# Patient Record
Sex: MALE | Race: WHITE | Employment: OTHER | URBAN - METROPOLITAN AREA
[De-identification: names, ages, dates, MRNs, and addresses within clinical notes are randomized per-mention and may not be internally consistent; named-entity substitution may affect disease eponyms.]

---

## 2018-10-30 ENCOUNTER — OFFICE VISIT (OUTPATIENT)
Dept: URGENT CARE | Facility: URGENT CARE | Age: 43
End: 2018-10-30
Payer: COMMERCIAL

## 2018-10-30 VITALS
TEMPERATURE: 97.2 F | OXYGEN SATURATION: 96 % | DIASTOLIC BLOOD PRESSURE: 74 MMHG | HEART RATE: 78 BPM | RESPIRATION RATE: 21 BRPM | SYSTOLIC BLOOD PRESSURE: 126 MMHG | WEIGHT: 190.06 LBS

## 2018-10-30 DIAGNOSIS — R06.2 WHEEZING: Primary | ICD-10-CM

## 2018-10-30 DIAGNOSIS — R05.3 PERSISTENT COUGH: ICD-10-CM

## 2018-10-30 PROCEDURE — 99204 OFFICE O/P NEW MOD 45 MIN: CPT | Mod: 25 | Performed by: PHYSICIAN ASSISTANT

## 2018-10-30 PROCEDURE — 94640 AIRWAY INHALATION TREATMENT: CPT | Performed by: PHYSICIAN ASSISTANT

## 2018-10-30 RX ORDER — LEVALBUTEROL INHALATION SOLUTION 1.25 MG/3ML
SOLUTION RESPIRATORY (INHALATION)
Qty: 1 ML | Refills: 0
Start: 2018-10-30 | End: 2019-10-18

## 2018-10-30 RX ORDER — DOXYCYCLINE 100 MG/1
100 CAPSULE ORAL 2 TIMES DAILY
Qty: 20 CAPSULE | Refills: 0 | Status: SHIPPED | OUTPATIENT
Start: 2018-10-30 | End: 2019-10-18

## 2018-10-30 RX ORDER — PREDNISONE 20 MG/1
20 TABLET ORAL DAILY
Qty: 5 TABLET | Refills: 0 | Status: SHIPPED | OUTPATIENT
Start: 2018-10-30 | End: 2019-10-18

## 2018-10-30 NOTE — PATIENT INSTRUCTIONS
(R06.2) Wheezing  (primary encounter diagnosis)  Comment:   Plan: INHALATION/NEBULIZER TREATMENT, INITIAL,         levalbuterol (XOPENEX) 1.25 MG/3ML neb         solution, predniSONE (DELTASONE) 20 MG tablet            (R05) Persistent cough  Comment:   Plan: doxycycline (VIBRAMYCIN) 100 MG capsule            Follow up with primary clinic for re-check within 3-5 days, sooner should symptoms persist or worsen.      Continue albuterol inhaler every 4 hours as needed.

## 2018-10-30 NOTE — MR AVS SNAPSHOT
"              After Visit Summary   10/30/2018    Arden Dee    MRN: [id number]           Patient Information     Date Of Birth          1975        Visit Information        Provider Department      10/30/2018 12:45 PM Mya Holcomb PA-C [facility]        Today's Diagnoses     Wheezing    -  1    Persistent cough          Care Instructions    (R06.2) Wheezing  (primary encounter diagnosis)  Comment:   Plan: INHALATION/NEBULIZER TREATMENT, INITIAL,         levalbuterol (XOPENEX) 1.25 MG/3ML neb         solution, predniSONE (DELTASONE) 20 MG tablet            (R05) Persistent cough  Comment:   Plan: doxycycline (VIBRAMYCIN) 100 MG capsule            Follow up with primary clinic for re-check within 3-5 days, sooner should symptoms persist or worsen.      Continue albuterol inhaler every 4 hours as needed.                Follow-ups after your visit        Who to contact     If you have questions or need follow up information about today's clinic visit or your schedule please contact [facility] directly at [phone].  Normal or non-critical lab and imaging results will be communicated to you by Spirehart, letter or phone within 4 business days after the clinic has received the results. If you do not hear from us within 7 days, please contact the clinic through 10sect or phone. If you have a critical or abnormal lab result, we will notify you by phone as soon as possible.  Submit refill requests through Marvel or call your pharmacy and they will forward the refill request to us. Please allow 3 business days for your refill to be completed.          Additional Information About Your Visit        MyChart Information     Marvel lets you send messages to your doctor, view your test results, renew your prescriptions, schedule appointments and more. To sign up, go to [email or web address] . Click on \"Log in\" on the left side of the screen, " "which will take you to the Welcome page. Then click on \"Sign up Now\" on the right side of the page.     You will be asked to enter the access code listed below, as well as some personal information. Please follow the directions to create your username and password.     Your access code is: [email or web address]  Expires: 2019  2:01 PM     Your access code will  in 90 days. If you need help or a new code, please call your [facility] clinic or [phone].        Care EveryWhere ID     This is your Care EveryWhere ID. This could be used by other organizations to access your [facility] medical records  [id number]        Your Vitals Were     Pulse Temperature Respirations Pulse Oximetry          78 97.2  F (36.2  C) (Oral) 21 96%         Blood Pressure from Last 3 Encounters:   10/30/18 126/74    Weight from Last 3 Encounters:   10/30/18 190 lb 1 oz (86.2 kg)              We Performed the Following     INHALATION/NEBULIZER TREATMENT, INITIAL          Today's Medication Changes          These changes are accurate as of 10/30/18  2:01 PM.  If you have any questions, ask your nurse or doctor.               Start taking these medicines.        Dose/Directions    doxycycline 100 MG capsule   Commonly known as:  VIBRAMYCIN   Used for:  Persistent cough   Started by:  Mya Holcomb PA-C        Dose:  100 mg   Take 1 capsule (100 mg) by mouth 2 times daily   Quantity:  20 capsule   Refills:  0       levalbuterol 1.25 MG/3ML neb solution   Commonly known as:  XOPENEX   Used for:  Wheezing   Started by:  Mya Holcomb PA-C        One nebulizer treatment in clinic   Quantity:  1 mL   Refills:  0       predniSONE 20 MG tablet   Commonly known as:  DELTASONE   Used for:  Wheezing   Started by:  Mya Holcomb PA-C        Dose:  20 mg   Take 1 tablet (20 mg) by mouth daily   Quantity:  5 tablet   Refills:  0            Where to get your medicines      These medications were sent to [facility] [address] [address] & [address], [place] [address]     Phone:  [phone]     doxycycline 100 MG capsule    predniSONE 20 MG tablet         Some of these will need a paper prescription and others can be bought over the counter.  Ask your nurse if you have questions.     You don't need a prescription for these medications     levalbuterol 1.25 MG/3ML neb solution                Primary Care Provider Fax #    Physician No Ref-Primary [phone]       No address on file        Equal Access to Services     [facility]: Hadii harmony sprague Sonadeem, waaxda luqadaha, qaybta kaalmada maria a, mat sanchez . So [facility] [phone].    ATENCIÓN: Si habla español, tiene a zuniga disposición servicios gratuitos de asistencia lingüística. Ll[facility] [phone].    We comply with applicable federal civil rights laws and [place] laws. We do not discriminate on the basis of race, color, national origin, age, disability, sex, sexual orientation, or gender identity.            Thank you!     Thank you for choosing [facility]  for your care. Our goal is always to provide you with excellent care. Hearing back from our patients is one way we can continue to improve our services. Please take a few minutes to complete the written survey that you may receive in the mail after your visit with us. Thank you!             Your Updated Medication List - Protect others around you: Learn how to safely use, store and throw away your medicines at www.disposemymeds.org.          This list is accurate as of 10/30/18  2:01 PM.  Always use your most recent med list.                   Brand Name Dispense Instructions for use Diagnosis    ALBUTEROL IN      Inhale into the lungs as needed        doxycycline 100 MG capsule    VIBRAMYCIN    20 capsule    Take 1 capsule (100 mg) by mouth 2 times daily    Persistent cough        levalbuterol 1.25 MG/3ML neb solution    XOPENEX    1 mL    One nebulizer treatment in clinic    Wheezing       predniSONE 20 MG tablet    DELTASONE    5 tablet    Take 1 tablet (20 mg) by mouth daily    Wheezing

## 2018-10-30 NOTE — PROGRESS NOTES
SUBJECTIVE:   Arden Dee is a 43 year old male presenting with a chief complaint of:  1) cough for the past few days, mostly dry  2) intermittent wheezing.  3) some runny nose/congestion  4) fatigue intermittently over the past month.  Some lightheadedness at times.  NO syncope.      Onset of symptoms was as above.  Course of illness is worsening.    Severity moderate  Current and Associated symptoms: as above  Treatment measures tried include albuterol inhaler with some relief.  Predisposing factors include h/o wheezing.  History of tobacco use, quit 4 years ago.    SH: has been seen at [facility] in the past    FH:  Father: reactive airway ?      No past medical history on file.     Wheezing 2 years ago, has used inhalers in the past    There is no problem list on file for this patient.    Social History   Substance Use Topics     Smoking status: Former Smoker     Smokeless tobacco: Never Used     Alcohol use Not on file       ROS:  CONSTITUTIONAL:NEGATIVE for fever, chills, change in weight  INTEGUMENTARY/SKIN: NEGATIVE for worrisome rashes, moles or lesions  EYES: NEGATIVE for vision changes or irritation  ENT/MOUTH: as per HPI  RESP:as per HPI  CV: NEGATIVE for chest pain, palpitations or peripheral edema  GI: NEGATIVE for nausea, abdominal pain, heartburn, or change in bowel habits  : negative for dysuria, hematuria, decreased urinary stream, erectile dysfunction  MUSCULOSKELETAL: NEGATIVE for significant arthralgias or myalgia  NEURO: NEGATIVE for weakness, dizziness or paresthesias  Review of systems negative except as stated above.    OBJECTIVE  :/74  Pulse 78  Temp 97.2  F (36.2  C) (Oral)  Resp 21  Wt 190 lb 1 oz (86.2 kg)  SpO2 96%  GENERAL APPEARANCE: healthy, alert and no distress  EYES: EOMI,  PERRL, conjunctiva clear  HENT: ear canals and TM's normal.  Nose and mouth without ulcers, erythema or lesions  NECK: supple, nontender, no lymphadenopathy  RESP: wheezing throughout  which improves but does not clear completely with neb in clinic  CV: regular rates and rhythm, normal S1 S2, no murmur noted  ABDOMEN:  soft, nontender, no HSM or masses and bowel sounds normal  NEURO: Normal strength and tone, sensory exam grossly normal,  normal speech and mentation  SKIN: no suspicious lesions or rashes    (R06.2) Wheezing  (primary encounter diagnosis)  Comment:   Plan: INHALATION/NEBULIZER TREATMENT, INITIAL,         levalbuterol (XOPENEX) 1.25 MG/3ML neb         solution, predniSONE (DELTASONE) 20 MG tablet            (R05) Persistent cough  Comment:   Plan: doxycycline (VIBRAMYCIN) 100 MG capsule            Follow up with primary clinic for re-check within 3-5 days, sooner should symptoms persist or worsen.      Continue albuterol inhaler every 4 hours as needed.

## 2019-10-18 ENCOUNTER — HOSPITAL ENCOUNTER (OUTPATIENT)
Facility: CLINIC | Age: 44
Setting detail: OBSERVATION
Discharge: HOME | End: 2019-10-19
Attending: EMERGENCY MEDICINE | Admitting: HOSPITALIST

## 2019-10-18 ENCOUNTER — APPOINTMENT (OUTPATIENT)
Dept: CT IMAGING | Facility: CLINIC | Age: 44
End: 2019-10-18
Attending: EMERGENCY MEDICINE

## 2019-10-18 DIAGNOSIS — R19.7 DIARRHEA OF PRESUMED INFECTIOUS ORIGIN: ICD-10-CM

## 2019-10-18 DIAGNOSIS — A49.8 CLOSTRIDIUM DIFFICILE INFECTION: Primary | ICD-10-CM

## 2019-10-18 DIAGNOSIS — E87.1 HYPONATREMIA: ICD-10-CM

## 2019-10-18 DIAGNOSIS — E86.9 VOLUME DEPLETION: ICD-10-CM

## 2019-10-18 LAB
ALBUMIN SERPL-MCNC: 1.6 G/DL (ref 3.4–5)
ALP SERPL-CCNC: 86 U/L (ref 40–150)
ALT SERPL W P-5'-P-CCNC: 87 U/L (ref 0–70)
ANION GAP SERPL CALCULATED.3IONS-SCNC: 6 MMOL/L (ref 3–14)
AST SERPL W P-5'-P-CCNC: 40 U/L (ref 0–45)
BASOPHILS # BLD AUTO: 0 10E9/L (ref 0–0.2)
BASOPHILS NFR BLD AUTO: 0.2 %
BILIRUB SERPL-MCNC: 0.4 MG/DL (ref 0.2–1.3)
BUN SERPL-MCNC: 9 MG/DL (ref 7–30)
C COLI+JEJUNI+LARI FUSA STL QL NAA+PROBE: NOT DETECTED
C DIFF TOX B STL QL: POSITIVE
CALCIUM SERPL-MCNC: 7.9 MG/DL (ref 8.5–10.1)
CHLORIDE SERPL-SCNC: 95 MMOL/L (ref 94–109)
CO2 SERPL-SCNC: 29 MMOL/L (ref 20–32)
CREAT SERPL-MCNC: 0.52 MG/DL (ref 0.66–1.25)
DIFFERENTIAL METHOD BLD: ABNORMAL
EC STX1 GENE STL QL NAA+PROBE: NOT DETECTED
EC STX2 GENE STL QL NAA+PROBE: NOT DETECTED
ENTERIC PATHOGEN COMMENT: NORMAL
EOSINOPHIL # BLD AUTO: 0 10E9/L (ref 0–0.7)
EOSINOPHIL NFR BLD AUTO: 0.4 %
ERYTHROCYTE [DISTWIDTH] IN BLOOD BY AUTOMATED COUNT: 13.4 % (ref 10–15)
GFR SERPL CREATININE-BSD FRML MDRD: >90 ML/MIN/{1.73_M2}
GLUCOSE SERPL-MCNC: 108 MG/DL (ref 70–99)
HCT VFR BLD AUTO: 35 % (ref 40–53)
HGB BLD-MCNC: 11.7 G/DL (ref 13.3–17.7)
IMM GRANULOCYTES # BLD: 0.1 10E9/L (ref 0–0.4)
IMM GRANULOCYTES NFR BLD: 1 %
LYMPHOCYTES # BLD AUTO: 1.9 10E9/L (ref 0.8–5.3)
LYMPHOCYTES NFR BLD AUTO: 22.6 %
MCH RBC QN AUTO: 28.3 PG (ref 26.5–33)
MCHC RBC AUTO-ENTMCNC: 33.4 G/DL (ref 31.5–36.5)
MCV RBC AUTO: 85 FL (ref 78–100)
MONOCYTES # BLD AUTO: 1 10E9/L (ref 0–1.3)
MONOCYTES NFR BLD AUTO: 12.1 %
NEUTROPHILS # BLD AUTO: 5.2 10E9/L (ref 1.6–8.3)
NEUTROPHILS NFR BLD AUTO: 63.7 %
NOROV GI+II ORF1-ORF2 JNC STL QL NAA+PR: NOT DETECTED
NRBC # BLD AUTO: 0 10*3/UL
NRBC BLD AUTO-RTO: 0 /100
PLATELET # BLD AUTO: 556 10E9/L (ref 150–450)
POTASSIUM SERPL-SCNC: 3.5 MMOL/L (ref 3.4–5.3)
PROT SERPL-MCNC: 6.4 G/DL (ref 6.8–8.8)
RBC # BLD AUTO: 4.13 10E12/L (ref 4.4–5.9)
RVA NSP5 STL QL NAA+PROBE: NOT DETECTED
SALMONELLA SP RPOD STL QL NAA+PROBE: NOT DETECTED
SHIGELLA SP+EIEC IPAH STL QL NAA+PROBE: NOT DETECTED
SODIUM SERPL-SCNC: 130 MMOL/L (ref 133–144)
SPECIMEN SOURCE: ABNORMAL
V CHOL+PARA RFBL+TRKH+TNAA STL QL NAA+PR: NOT DETECTED
WBC # BLD AUTO: 8.2 10E9/L (ref 4–11)
Y ENTERO RECN STL QL NAA+PROBE: NOT DETECTED

## 2019-10-18 PROCEDURE — 74177 CT ABD & PELVIS W/CONTRAST: CPT

## 2019-10-18 PROCEDURE — 87493 C DIFF AMPLIFIED PROBE: CPT | Performed by: EMERGENCY MEDICINE

## 2019-10-18 PROCEDURE — 80053 COMPREHEN METABOLIC PANEL: CPT | Performed by: EMERGENCY MEDICINE

## 2019-10-18 PROCEDURE — 99219 ZZC INITIAL OBSERVATION CARE,LEVL II: CPT | Performed by: HOSPITALIST

## 2019-10-18 PROCEDURE — 96375 TX/PRO/DX INJ NEW DRUG ADDON: CPT

## 2019-10-18 PROCEDURE — 25000125 ZZHC RX 250: Performed by: EMERGENCY MEDICINE

## 2019-10-18 PROCEDURE — 87506 IADNA-DNA/RNA PROBE TQ 6-11: CPT | Performed by: EMERGENCY MEDICINE

## 2019-10-18 PROCEDURE — 25800030 ZZH RX IP 258 OP 636: Performed by: HOSPITALIST

## 2019-10-18 PROCEDURE — 99285 EMERGENCY DEPT VISIT HI MDM: CPT | Mod: 25

## 2019-10-18 PROCEDURE — 96365 THER/PROPH/DIAG IV INF INIT: CPT | Mod: 59

## 2019-10-18 PROCEDURE — 85025 COMPLETE CBC W/AUTO DIFF WBC: CPT | Performed by: EMERGENCY MEDICINE

## 2019-10-18 PROCEDURE — 25000128 H RX IP 250 OP 636: Performed by: EMERGENCY MEDICINE

## 2019-10-18 PROCEDURE — 25000128 H RX IP 250 OP 636: Performed by: HOSPITALIST

## 2019-10-18 PROCEDURE — 25000132 ZZH RX MED GY IP 250 OP 250 PS 637: Performed by: HOSPITALIST

## 2019-10-18 PROCEDURE — 99207 ZZC CDG-MDM COMPONENT: MEETS LOW - DOWN CODED: CPT | Performed by: HOSPITALIST

## 2019-10-18 PROCEDURE — 96361 HYDRATE IV INFUSION ADD-ON: CPT

## 2019-10-18 PROCEDURE — 96376 TX/PRO/DX INJ SAME DRUG ADON: CPT

## 2019-10-18 PROCEDURE — G0378 HOSPITAL OBSERVATION PER HR: HCPCS

## 2019-10-18 RX ORDER — PAROXETINE 20 MG/1
20 TABLET, FILM COATED ORAL EVERY MORNING
Status: DISCONTINUED | OUTPATIENT
Start: 2019-10-19 | End: 2019-10-19 | Stop reason: HOSPADM

## 2019-10-18 RX ORDER — VANCOMYCIN HYDROCHLORIDE 50 MG/ML
125 KIT ORAL 4 TIMES DAILY
Status: DISCONTINUED | OUTPATIENT
Start: 2019-10-19 | End: 2019-10-19 | Stop reason: HOSPADM

## 2019-10-18 RX ORDER — MORPHINE SULFATE 4 MG/ML
4 INJECTION, SOLUTION INTRAMUSCULAR; INTRAVENOUS ONCE
Status: COMPLETED | OUTPATIENT
Start: 2019-10-18 | End: 2019-10-18

## 2019-10-18 RX ORDER — VANCOMYCIN HYDROCHLORIDE 50 MG/ML
125 KIT ORAL ONCE
Status: COMPLETED | OUTPATIENT
Start: 2019-10-18 | End: 2019-10-18

## 2019-10-18 RX ORDER — NALOXONE HYDROCHLORIDE 0.4 MG/ML
.1-.4 INJECTION, SOLUTION INTRAMUSCULAR; INTRAVENOUS; SUBCUTANEOUS
Status: DISCONTINUED | OUTPATIENT
Start: 2019-10-18 | End: 2019-10-19 | Stop reason: HOSPADM

## 2019-10-18 RX ORDER — LACTOBACILLUS RHAMNOSUS GG 10B CELL
1 CAPSULE ORAL DAILY
COMMUNITY

## 2019-10-18 RX ORDER — HYDROXYZINE HYDROCHLORIDE 10 MG/5ML
4 SYRUP ORAL EVERY 6 HOURS PRN
COMMUNITY

## 2019-10-18 RX ORDER — ONDANSETRON 2 MG/ML
4 INJECTION INTRAMUSCULAR; INTRAVENOUS EVERY 6 HOURS PRN
Status: DISCONTINUED | OUTPATIENT
Start: 2019-10-18 | End: 2019-10-19 | Stop reason: HOSPADM

## 2019-10-18 RX ORDER — CLONAZEPAM 0.5 MG/1
0.5 TABLET ORAL 2 TIMES DAILY PRN
Status: DISCONTINUED | OUTPATIENT
Start: 2019-10-18 | End: 2019-10-19 | Stop reason: HOSPADM

## 2019-10-18 RX ORDER — PAROXETINE 10 MG/1
20 TABLET, FILM COATED ORAL EVERY MORNING
COMMUNITY

## 2019-10-18 RX ORDER — SODIUM CHLORIDE 9 MG/ML
INJECTION, SOLUTION INTRAVENOUS CONTINUOUS
Status: DISCONTINUED | OUTPATIENT
Start: 2019-10-18 | End: 2019-10-19 | Stop reason: HOSPADM

## 2019-10-18 RX ORDER — ACETAMINOPHEN 650 MG/1
650 SUPPOSITORY RECTAL EVERY 4 HOURS PRN
Status: DISCONTINUED | OUTPATIENT
Start: 2019-10-18 | End: 2019-10-19 | Stop reason: HOSPADM

## 2019-10-18 RX ORDER — ONDANSETRON 4 MG/1
4 TABLET, ORALLY DISINTEGRATING ORAL EVERY 6 HOURS PRN
Status: DISCONTINUED | OUTPATIENT
Start: 2019-10-18 | End: 2019-10-19 | Stop reason: HOSPADM

## 2019-10-18 RX ORDER — IOPAMIDOL 755 MG/ML
83 INJECTION, SOLUTION INTRAVASCULAR ONCE
Status: COMPLETED | OUTPATIENT
Start: 2019-10-18 | End: 2019-10-18

## 2019-10-18 RX ORDER — CLONAZEPAM 0.5 MG/1
0.5 TABLET ORAL 2 TIMES DAILY PRN
COMMUNITY

## 2019-10-18 RX ORDER — SODIUM CHLORIDE 9 MG/ML
INJECTION, SOLUTION INTRAVENOUS ONCE
Status: DISCONTINUED | OUTPATIENT
Start: 2019-10-18 | End: 2019-10-18

## 2019-10-18 RX ORDER — MORPHINE SULFATE 2 MG/ML
2-4 INJECTION, SOLUTION INTRAMUSCULAR; INTRAVENOUS
Status: DISCONTINUED | OUTPATIENT
Start: 2019-10-18 | End: 2019-10-19 | Stop reason: HOSPADM

## 2019-10-18 RX ORDER — CEFTRIAXONE 1 G/1
1 INJECTION, POWDER, FOR SOLUTION INTRAMUSCULAR; INTRAVENOUS ONCE
Status: COMPLETED | OUTPATIENT
Start: 2019-10-18 | End: 2019-10-18

## 2019-10-18 RX ORDER — LIDOCAINE 40 MG/G
CREAM TOPICAL
Status: DISCONTINUED | OUTPATIENT
Start: 2019-10-18 | End: 2019-10-19 | Stop reason: HOSPADM

## 2019-10-18 RX ORDER — ACETAMINOPHEN 325 MG/1
650 TABLET ORAL EVERY 4 HOURS PRN
Status: DISCONTINUED | OUTPATIENT
Start: 2019-10-18 | End: 2019-10-19 | Stop reason: HOSPADM

## 2019-10-18 RX ORDER — LACTOBACILLUS RHAMNOSUS GG 10B CELL
1 CAPSULE ORAL DAILY
Status: DISCONTINUED | OUTPATIENT
Start: 2019-10-19 | End: 2019-10-19 | Stop reason: HOSPADM

## 2019-10-18 RX ADMIN — IOPAMIDOL 83 ML: 755 INJECTION, SOLUTION INTRAVENOUS at 12:59

## 2019-10-18 RX ADMIN — VANCOMYCIN HYDROCHLORIDE 125 MG: KIT at 21:54

## 2019-10-18 RX ADMIN — SODIUM CHLORIDE 1000 ML: 9 INJECTION, SOLUTION INTRAVENOUS at 11:53

## 2019-10-18 RX ADMIN — MORPHINE SULFATE 4 MG: 4 INJECTION INTRAVENOUS at 14:28

## 2019-10-18 RX ADMIN — CEFTRIAXONE SODIUM 1 G: 1 INJECTION, POWDER, FOR SOLUTION INTRAMUSCULAR; INTRAVENOUS at 14:31

## 2019-10-18 RX ADMIN — MORPHINE SULFATE 2 MG: 2 INJECTION, SOLUTION INTRAMUSCULAR; INTRAVENOUS at 16:27

## 2019-10-18 RX ADMIN — MORPHINE SULFATE 2 MG: 2 INJECTION, SOLUTION INTRAMUSCULAR; INTRAVENOUS at 20:39

## 2019-10-18 RX ADMIN — SODIUM CHLORIDE 65 ML: 9 INJECTION, SOLUTION INTRAVENOUS at 13:00

## 2019-10-18 RX ADMIN — SODIUM CHLORIDE, PRESERVATIVE FREE: 5 INJECTION INTRAVENOUS at 16:11

## 2019-10-18 ASSESSMENT — ENCOUNTER SYMPTOMS
BACK PAIN: 1
BLOOD IN STOOL: 0
ABDOMINAL PAIN: 1
DIARRHEA: 1
DIAPHORESIS: 1
FEVER: 0

## 2019-10-18 ASSESSMENT — MIFFLIN-ST. JEOR: SCORE: 1633.82

## 2019-10-18 NOTE — ED NOTES
"[facility]  ED Nurse Handoff Report    ED Chief complaint: Diarrhea      ED Diagnosis:   Final diagnoses:   Hyponatremia   Volume depletion   Diarrhea of presumed infectious origin       Code Status: Full Code    Allergies:   Allergies   Allergen Reactions     Penicillins Shortness Of Breath and Hives     All cillin's medication.        Activity level - Baseline/Home:  Independent  Activity Level - Current:   Independent    Patient's Preferred language: English   Needed?: No    Isolation: Yes  Infection: Not Applicable  Bariatric?: No    Vital Signs:   Vitals:    10/18/19 1108   BP: 101/66   Pulse: 98   Resp: 16   Temp: 98.1  F (36.7  C)   TempSrc: Oral   SpO2: 97%   Weight: 74.8 kg (165 lb)   Height: 1.753 m (5' 9\")       Cardiac Rhythm: ,        Pain level: 0-10 Pain Scale: 9    Is this patient confused?: No   Does this patient have a guardian?  No         If yes, is there guardianship documents in the Epic \"Code/ACP\" activity?  N/A         Guardian Notified?  N/A  [facility] - Suicide Severity Rating Scale Completed?  Yes  If yes, what color did the patient score?  White    Patient Report: Initial Complaint: Diarrhea  Focused Assessment: Pt with shigella diarrhea a few weeks ago. Pt was compliant with abx expect for the last 2 days. Now diarrhea returned with burning in the abd. Pt was given Morphine for pain and Rocephin. CT scan inflammation throughout the bowel.  Tests Performed: CT scan and blood work.  Abnormal Results:   Results for orders placed or performed during the hospital encounter of 10/18/19   CT Abdomen Pelvis w Contrast    Narrative    CT ABDOMEN AND PELVIS WITH CONTRAST 10/18/2019 1:06 PM    HISTORY:  44-year-old patient with acute generalized abdominal pain  with fever.    COMPARISON: None.    TECHNIQUE: Axial and coronal CT images obtained from the lung bases  through the abdomen and pelvis after the uneventful administration of  Isovue 370 intravenous contrast given " for a total of 83 mL. Radiation  dose for this scan was reduced using automated exposure control,  adjustment of the mA and/or kV according to patient size, or iterative  reconstruction technique.    FINDINGS: The visible lung bases are clear. Heart size is normal. The  liver, gallbladder, spleen, adrenal glands, and pancreas are  unremarkable. Both kidneys are normally perfused. No hydronephrosis or  nephrolithiasis. Trace free fluid in the left paracolic gutter and  pelvis. The bladder is decompressed. No acute osseous abnormality.  There is wall thickening and mild distention in the colon at the  splenic flexure as well as possible involvement of both ascending and  descending portions of the colon. Some inflammatory changes do  surround majority of the colon, though seemingly with rectal sparing.  The appendix is normal in size and appearance. No abnormally dilated  loops of small bowel.      Impression    IMPRESSION:  Wall thickening with variable degree of surrounding  inflammatory change throughout majority of the colon involving  ascending, transverse, descending portions of the colon, though  without obvious sigmoid or rectal involvement. Differential diagnosis  includes inflammatory or infectious etiology as most likely. Ischemic  or neoplastic considered less likely. Appendix is normal in size and  appearance.    DENISE SHAW MD   CBC with platelets differential   Result Value Ref Range    WBC 8.2 4.0 - 11.0 10e9/L    RBC Count 4.13 (L) 4.4 - 5.9 10e12/L    Hemoglobin 11.7 (L) 13.3 - 17.7 g/dL    Hematocrit 35.0 (L) 40.0 - 53.0 %    MCV 85 78 - 100 fl    MCH 28.3 26.5 - 33.0 pg    MCHC 33.4 31.5 - 36.5 g/dL    RDW 13.4 10.0 - 15.0 %    Platelet Count 556 (H) 150 - 450 10e9/L    Diff Method Automated Method     % Neutrophils 63.7 %    % Lymphocytes 22.6 %    % Monocytes 12.1 %    % Eosinophils 0.4 %    % Basophils 0.2 %    % Immature Granulocytes 1.0 %    Nucleated RBCs 0 0 /100    Absolute Neutrophil  5.2 1.6 - 8.3 10e9/L    Absolute Lymphocytes 1.9 0.8 - 5.3 10e9/L    Absolute Monocytes 1.0 0.0 - 1.3 10e9/L    Absolute Eosinophils 0.0 0.0 - 0.7 10e9/L    Absolute Basophils 0.0 0.0 - 0.2 10e9/L    Abs Immature Granulocytes 0.1 0 - 0.4 10e9/L    Absolute Nucleated RBC 0.0    Comprehensive metabolic panel   Result Value Ref Range    Sodium 130 (L) 133 - 144 mmol/L    Potassium 3.5 3.4 - 5.3 mmol/L    Chloride 95 94 - 109 mmol/L    Carbon Dioxide 29 20 - 32 mmol/L    Anion Gap 6 3 - 14 mmol/L    Glucose 108 (H) 70 - 99 mg/dL    Urea Nitrogen 9 7 - 30 mg/dL    Creatinine 0.52 (L) 0.66 - 1.25 mg/dL    GFR Estimate >90 >60 mL/min/[1.73_m2]    GFR Estimate If Black >90 >60 mL/min/[1.73_m2]    Calcium 7.9 (L) 8.5 - 10.1 mg/dL    Bilirubin Total 0.4 0.2 - 1.3 mg/dL    Albumin 1.6 (L) 3.4 - 5.0 g/dL    Protein Total 6.4 (L) 6.8 - 8.8 g/dL    Alkaline Phosphatase 86 40 - 150 U/L    ALT 87 (H) 0 - 70 U/L    AST 40 0 - 45 U/L       Treatments provided: Rocephin  Family Comments: Kristyn at the bedside.     OBS brochure/video discussed/provided to patient/family: Yes              Name of person given brochure if not patient: Patient            Relationship to patient: NA    ED Medications:   Medications   cefTRIAXone (ROCEPHIN) 1 g vial to attach to  mL bag for ADULTS or NS 50 mL bag for PEDS (1 g Intravenous New Bag 10/18/19 9801)   sodium chloride 0.9% infusion (has no administration in time range)   0.9% sodium chloride BOLUS (1,000 mLs Intravenous New Bag 10/18/19 1153)   iohexol (OMNIPAQUE) solution 25 mL (25 mLs Oral Given 10/18/19 1232)   iopamidol (ISOVUE-370) solution 83 mL (83 mLs Intravenous Given 10/18/19 1259)   Saline flush (65 mLs Intravenous Given 10/18/19 1300)   morphine (PF) injection 4 mg (4 mg Intravenous Given 10/18/19 1428)       Drips infusing?:  No    For the majority of the shift this patient was Green.   Interventions performed were NA.    Severe Sepsis OR Septic Shock Diagnosis Present:  No    To be done/followed up on inpatient unit:  Unknown    ED NURSE PHONE NUMBER: [phone]

## 2019-10-18 NOTE — PLAN OF CARE
Pt admitted to the floor at 1544. A&Ox4. VSS on RA. L/s clear. Denies SOB or chest pain. Abdomen soft, tender to palpation. Intermittent abdominal cramping managed w/ prn iv Morphine w/ good relief. Up independently in room. No loose stool since admission. Stool test for C diff pending. Tolerating clears. Denies nausea. PIV infusing. Enteric isolation maintained. GI consult pending. Will continue to monitor.

## 2019-10-18 NOTE — PHARMACY-ADMISSION MEDICATION HISTORY
Admission medication history interview status for the 10/18/2019  admission is complete. See EPIC admission navigator for prior to admission medications     Medication history source reliability:Good    Actions taken by pharmacist (provider contacted, etc): spoke with patient     Additional medication history information not noted on PTA med list :None    Medication reconciliation/reorder completed by provider prior to medication history? No    Time spent in this activity: 10 min    Prior to Admission medications    Medication Sig Last Dose Taking? Auth Provider   chlorpheniramine (CHLOR-TRIMETON) 4 MG tablet Take 4 mg by mouth every 6 hours as needed for allergies or rhinitis prn Yes Unknown, Entered By History   clonazePAM (KLONOPIN) 0.5 MG tablet Take 0.5 mg by mouth 2 times daily as needed for anxiety 10/18/2019 at Unknown time Yes Unknown, Entered By History   hypromellose (ARTIFICIAL TEARS) 0.5 % SOLN ophthalmic solution Place 1 drop into both eyes every hour as needed for dry eyes prn Yes Unknown, Entered By History   lactobacillus rhamnosus, GG, (CULTURELL) capsule Take 1 capsule by mouth daily 10/18/2019 at Unknown time Yes Unknown, Entered By History   PARoxetine (PAXIL) 10 MG tablet Take 20 mg by mouth every morning 10/18/2019 at Unknown time Yes Unknown, Entered By History   ALBUTEROL IN Inhale 1-2 puffs into the lungs every 6 hours as needed  prn  Reported, Patient       Luci Singh, PharmD

## 2019-10-18 NOTE — ED TRIAGE NOTES
Chronic diarrhea for several months. Urgent care dx with shigellae, started on abx. Abx changed type of diarrhea. Lost 40 lbs since July.

## 2019-10-18 NOTE — PROGRESS NOTES
RECEIVING UNIT ED HANDOFF REVIEW    ED Nurse Handoff Report was reviewed by: Jennifer Triplett RN on October 18, 2019 at 3:07 PM

## 2019-10-18 NOTE — PROGRESS NOTES
Observation goals PRIOR TO DISCHARGE     Comments: -diagnostic tests and consults completed and resulted: Not MET-C diff results pending. GI consult pending    -vital signs normal or at patient baseline: MET     -tolerating oral intake to maintain hydration: Not MET    Nurse to notify provider when observation goals have been met and patient is ready for discharge.

## 2019-10-18 NOTE — ED PROVIDER NOTES
"  History     Chief Complaint:  Diarrhea    The history is provided by the patient.      Arden Dee is a 44 year old male who presents with diarrhea. The patient reports having diarrhea since September 28th-29th. He was seen at urgent care and diagnosed with Shigella and started on bactrim on 10/10/19. The patient stopped taking his bactrim after 5.5 days because he developed \"a different diarrhea\" and had bad abdominal cramps with his last day taking it 3 days ago. The patient thought he was getting better but 2 days ago he developed the increased diarrhea again and abdominal cramping. He states that last night it felt like his stomach was on \"fire.\" He denies any blood in his stool. He states that prior to the diarrhea, he was stressed and was not eating much along with possible anemia diagnosis that he was working with his doctor on and he has lost 40 pounds since July. He denies any fevers. He has had night sweats a couple times during this time. He states that he has been starting to have some back pain. He denies any recent travel or other recent antibiotic use besides the bactrim.     Allergies:  Penicillins     Medications:    Albuterol    Past Medical History:    Anxiety  Depression  Anaphylaxis  Asthma    Past Surgical History:    Hydrocele excision/repair  Ankle surgery  [place] teeth extraction    Family History:    History reviewed. No pertinent family history.    Social History:  Patient is single  Tobacco Use: Former smoker  PCP: Physician No Ref-Primary     Review of Systems   Constitutional: Positive for diaphoresis. Negative for fever.   Gastrointestinal: Positive for abdominal pain and diarrhea. Negative for blood in stool.   Musculoskeletal: Positive for back pain.   All other systems reviewed and are negative.    Physical Exam   First Vitals:  Patient Vitals for the past 24 hrs:   BP Temp Temp src Pulse Resp SpO2 Height Weight   10/18/19 1108 101/66 98.1  F (36.7  C) Oral 98 16 97 % 1.753 m " "(5' 9\") 74.8 kg (165 lb)     Physical Exam  Gen: Pleasant, moderately ill appearing.    Eye:   Pupils are equal, round, and reactive.     Sclera non-injected.    ENT:   Dry mucus membranes.     Normal tongue.    Oropharynx without lesions.    Cardiac:     Normal rate and regular rhythm.    No murmurs, gallops, or rubs.    Pulmonary:     Clear to auscultation bilaterally.    No wheezes, rales, or rhonchi.    Abdomen:     Normal active bowel sounds.     Abdomen is soft and non-distended. Lower abdominal pain with no rebound or guarding.     Musculoskeletal:     Normal movement of all extremities without evidence for deficit.    Extremities:    No edema.    Skin:   Warm and dry. Pale.    Neurologic:    Non-focal exam without asymmetric weakness or numbness.    Normal tone    Psychiatric:     Normal affect with appropriate interaction with examiner.    Emergency Department Course     Imaging:  Radiographic findings were communicated with the patient who voiced understanding of the findings.  CT abdomen pelvis w contrast:  CT Abdomen Pelvis w Contrast   Final Result   IMPRESSION:  Wall thickening with variable degree of surrounding   inflammatory change throughout majority of the colon involving   ascending, transverse, descending portions of the colon, though   without obvious sigmoid or rectal involvement. Differential diagnosis   includes inflammatory or infectious etiology as most likely. Ischemic   or neoplastic considered less likely. Appendix is normal in size and   appearance.      DENISE SHAW MD            Laboratory:  CBC:  WBC 8.2, HGB 11.7 low,  high  CMP: Na 130 low, Glucose 108 high, Creatinine 0.52 low, Calcium 7.9 low, Albumin 1.6 low, Protein 6.4 low, ALT 87 high, o/w WNL.   Enteric Bacteria and Virus Panel: Pending  Clostridium Difficile Toxin B: Pending  Interventions:    Medications   sodium chloride 0.9% infusion (has no administration in time range)   0.9% sodium chloride BOLUS (1,000 mLs " Intravenous New Bag 10/18/19 1153)   iohexol (OMNIPAQUE) solution 25 mL (25 mLs Oral Given 10/18/19 1232)   iopamidol (ISOVUE-370) solution 83 mL (83 mLs Intravenous Given 10/18/19 1259)   Saline flush (65 mLs Intravenous Given 10/18/19 1300)   cefTRIAXone (ROCEPHIN) 1 g vial to attach to  mL bag for ADULTS or NS 50 mL bag for PEDS (1 g Intravenous New Bag 10/18/19 1431)   morphine (PF) injection 4 mg (4 mg Intravenous Given 10/18/19 1428)       Emergency Department Course:  11:10 AM Nursing notes and vitals reviewed.  I performed an exam of the patient as documented above.     3:14 PM  Patient feels improved following above interventions.  Discussed with Dr. Kam & Plan      Medical Decision Making:  Arden Dee is a 44 year old male who presents for evaluation of persistent diarrhea with crampy abdominal pain nonfocal abdominal exam.  There are no ill contacts.  The patient was recently treated for Shigella infection with Bactrim, but did not complete his treatment.  Symptoms returned shortly after stopping antibiotics.  I also considered bowel obstruction, intra-abdominal infection such as colitis, food poisoning, cholecystitis, UTI, pyelonephritis, and appendicitis.  Fortunately, CT is negative for any of the above but does show diffuse enteritis.  This is likely related to an infectious cause.  He was started on treatment with Rocephin for possible resistant Shigella infection.  This was indicated given prolonged symptoms and the fact that he appears moderately ill. Clinically, he does appear mildly dehydrated, but not toxic.  Labs are notable for mild hyponatremia likely related to volume depletion, as well as mild transaminitis.  Given that she is not immunosupressed or toxic, antibiotics are not indicated.  He will be admitted for continued treatment, IV fluids, recheck of sodium level, and management of abdominal pain.    Diagnosis:    ICD-10-CM    1. Hyponatremia E87.1    2. Volume  depletion E86.9    3. Diarrhea of presumed infectious origin R19.7        Disposition:  Admitted to observation  I, Bradley Aasen, am serving as a scribe on 10/18/2019 at 11:09 AM to personally document services performed by Laura Zhang MD based on my observations and the provider's statements to me.        Laura Zhang MD  10/18/19 1528

## 2019-10-19 VITALS
OXYGEN SATURATION: 94 % | DIASTOLIC BLOOD PRESSURE: 55 MMHG | WEIGHT: 169 LBS | BODY MASS INDEX: 25.03 KG/M2 | RESPIRATION RATE: 18 BRPM | TEMPERATURE: 96.1 F | HEIGHT: 69 IN | HEART RATE: 83 BPM | SYSTOLIC BLOOD PRESSURE: 98 MMHG

## 2019-10-19 LAB
ANION GAP SERPL CALCULATED.3IONS-SCNC: 5 MMOL/L (ref 3–14)
BUN SERPL-MCNC: 6 MG/DL (ref 7–30)
CALCIUM SERPL-MCNC: 7.9 MG/DL (ref 8.5–10.1)
CHLORIDE SERPL-SCNC: 98 MMOL/L (ref 94–109)
CO2 SERPL-SCNC: 27 MMOL/L (ref 20–32)
CREAT SERPL-MCNC: 0.61 MG/DL (ref 0.66–1.25)
ERYTHROCYTE [DISTWIDTH] IN BLOOD BY AUTOMATED COUNT: 13.7 % (ref 10–15)
GFR SERPL CREATININE-BSD FRML MDRD: >90 ML/MIN/{1.73_M2}
GLUCOSE SERPL-MCNC: 81 MG/DL (ref 70–99)
HCT VFR BLD AUTO: 32.8 % (ref 40–53)
HGB BLD-MCNC: 11 G/DL (ref 13.3–17.7)
MCH RBC QN AUTO: 28.6 PG (ref 26.5–33)
MCHC RBC AUTO-ENTMCNC: 33.5 G/DL (ref 31.5–36.5)
MCV RBC AUTO: 85 FL (ref 78–100)
PLATELET # BLD AUTO: 474 10E9/L (ref 150–450)
POTASSIUM SERPL-SCNC: 3.6 MMOL/L (ref 3.4–5.3)
RBC # BLD AUTO: 3.84 10E12/L (ref 4.4–5.9)
SODIUM SERPL-SCNC: 130 MMOL/L (ref 133–144)
WBC # BLD AUTO: 6.6 10E9/L (ref 4–11)

## 2019-10-19 PROCEDURE — 25000128 H RX IP 250 OP 636: Performed by: HOSPITALIST

## 2019-10-19 PROCEDURE — 80048 BASIC METABOLIC PNL TOTAL CA: CPT | Performed by: HOSPITALIST

## 2019-10-19 PROCEDURE — 99217 ZZC OBSERVATION CARE DISCHARGE: CPT | Performed by: PHYSICIAN ASSISTANT

## 2019-10-19 PROCEDURE — 25000132 ZZH RX MED GY IP 250 OP 250 PS 637: Performed by: HOSPITALIST

## 2019-10-19 PROCEDURE — 85027 COMPLETE CBC AUTOMATED: CPT | Performed by: HOSPITALIST

## 2019-10-19 PROCEDURE — 36415 COLL VENOUS BLD VENIPUNCTURE: CPT | Performed by: HOSPITALIST

## 2019-10-19 PROCEDURE — G0378 HOSPITAL OBSERVATION PER HR: HCPCS

## 2019-10-19 PROCEDURE — 25000132 ZZH RX MED GY IP 250 OP 250 PS 637: Performed by: PHYSICIAN ASSISTANT

## 2019-10-19 PROCEDURE — 25000132 ZZH RX MED GY IP 250 OP 250 PS 637: Performed by: INTERNAL MEDICINE

## 2019-10-19 PROCEDURE — 25800030 ZZH RX IP 258 OP 636: Performed by: HOSPITALIST

## 2019-10-19 PROCEDURE — 96376 TX/PRO/DX INJ SAME DRUG ADON: CPT

## 2019-10-19 RX ORDER — HYOSCYAMINE SULFATE 0.125 MG
125 TABLET ORAL EVERY 4 HOURS PRN
Status: DISCONTINUED | OUTPATIENT
Start: 2019-10-19 | End: 2019-10-19 | Stop reason: HOSPADM

## 2019-10-19 RX ORDER — SIMETHICONE 80 MG
80 TABLET,CHEWABLE ORAL EVERY 6 HOURS PRN
Qty: 12 TABLET | Refills: 0 | Status: SHIPPED | OUTPATIENT
Start: 2019-10-19 | End: 2019-11-18

## 2019-10-19 RX ORDER — HYOSCYAMINE SULFATE 0.125 MG
125 TABLET ORAL EVERY 4 HOURS PRN
Qty: 30 TABLET | Refills: 0 | Status: SHIPPED | OUTPATIENT
Start: 2019-10-19

## 2019-10-19 RX ORDER — OXYCODONE HYDROCHLORIDE 5 MG/1
5 TABLET ORAL EVERY 6 HOURS PRN
Qty: 12 TABLET | Refills: 0 | Status: SHIPPED | OUTPATIENT
Start: 2019-10-19 | End: 2019-11-18

## 2019-10-19 RX ORDER — OXYCODONE HYDROCHLORIDE 5 MG/1
5-10 TABLET ORAL EVERY 6 HOURS PRN
Status: DISCONTINUED | OUTPATIENT
Start: 2019-10-19 | End: 2019-10-19 | Stop reason: HOSPADM

## 2019-10-19 RX ORDER — VANCOMYCIN HYDROCHLORIDE 50 MG/ML
125 KIT ORAL 4 TIMES DAILY
Qty: 140 ML | Refills: 0 | Status: SHIPPED | OUTPATIENT
Start: 2019-10-19 | End: 2019-11-18

## 2019-10-19 RX ADMIN — Medication 1 CAPSULE: at 08:10

## 2019-10-19 RX ADMIN — PAROXETINE HYDROCHLORIDE 20 MG: 20 TABLET, FILM COATED ORAL at 08:10

## 2019-10-19 RX ADMIN — VANCOMYCIN HYDROCHLORIDE 125 MG: KIT at 12:21

## 2019-10-19 RX ADMIN — OXYCODONE HYDROCHLORIDE 5 MG: 5 TABLET ORAL at 12:21

## 2019-10-19 RX ADMIN — MORPHINE SULFATE 2 MG: 2 INJECTION, SOLUTION INTRAMUSCULAR; INTRAVENOUS at 03:54

## 2019-10-19 RX ADMIN — ACETAMINOPHEN 650 MG: 325 TABLET, FILM COATED ORAL at 11:49

## 2019-10-19 RX ADMIN — MORPHINE SULFATE 2 MG: 2 INJECTION, SOLUTION INTRAMUSCULAR; INTRAVENOUS at 00:42

## 2019-10-19 RX ADMIN — VANCOMYCIN HYDROCHLORIDE 125 MG: KIT at 16:17

## 2019-10-19 RX ADMIN — HYOSCYAMINE SULFATE 125 MCG: 0.12 TABLET ORAL at 10:44

## 2019-10-19 RX ADMIN — VANCOMYCIN HYDROCHLORIDE 125 MG: KIT at 08:30

## 2019-10-19 RX ADMIN — SODIUM CHLORIDE, PRESERVATIVE FREE: 5 INJECTION INTRAVENOUS at 11:41

## 2019-10-19 RX ADMIN — SODIUM CHLORIDE, PRESERVATIVE FREE: 5 INJECTION INTRAVENOUS at 00:49

## 2019-10-19 RX ADMIN — MORPHINE SULFATE 2 MG: 2 INJECTION, SOLUTION INTRAMUSCULAR; INTRAVENOUS at 08:10

## 2019-10-19 ASSESSMENT — MIFFLIN-ST. JEOR: SCORE: 1651.96

## 2019-10-19 NOTE — PLAN OF CARE
VSS. A/O. Ind. Morphine given once. Cramping abdo pain. Tolerating diet. + c-diff. Pt education handout given.

## 2019-10-19 NOTE — DISCHARGE SUMMARY
[facility]  Hospitalist Discharge Summary       Date of Admission:  10/18/2019  Date of Discharge:  10/19/2019  Discharging Provider: Oralia Christianson PA-C      Discharge Diagnoses   C. difficile infection  Anemia  Hyponatremia, mild  Unintentional weight loss related to C. difficile colitis    Chronic stable diagnoses  Mild intermittent asthma  Anxiety  Depression    Follow-ups Needed After Discharge   Follow-up Appointments     Follow-up and recommended labs and tests       Follow up with primary care provider, Physician No Ref-Primary, within 7   days to evaluate treatment change and for hospital follow- up.  The   following labs/tests are recommended: repeat basic labs and follow up on   Cdiff improvement.             Discharge Disposition   Discharged to home  Condition at discharge: Stable    Hospital Course   Arden Dee is a 44 year old male with a history of depression and asthma who presented to the ER today with a 3-week history of diarrhea.  He was noted to be hyponatremic, mildly anemic, and dehydrated in the ER.  Stool testing was obtained and is pending.  He was given IV fluids and a dose of Rocephin.  Hospitalist service was contacted to bring the patient in for further evaluation and management and the patient was registered to observation on 10/18/2019.     C. Difficile Infection (CDI)  This has been going on for about 3 weeks PTA. Diagnosed with Shigella at an urgent care center on 10/10/19 and given a course of Bactrim, of which he completed 5-1/2 of the 7 prescribed days. On admit, abdomen is tender to palpation but no rebound or guarding. Mildly anemic, no leukocytosis. CT scan on admit shows wall thickening with variable degree of surrounding inflammatory change throughout the majority of the colon involving the ascending, transverse, descending portions of the colon, but without obvious sigmoid or rectal involvement. Given 1 dose of Rocephin in the ED.  Cdiff (+) toxin  B PCR. Remainder of stool IRENE negative. Started on PO vancomycin 125mg 4x/day, started night of admission for Cdiff and sent with a 2-week course on discharge per GI recommendations.  He had enteric precautions for his infection.  Was initially thought may be he had inflammatory bowel disease underlying with his anemia and diarrhea however after further investigation patient was diagnosed with Shigella in urgent earlier this month and treated with a course of Bactrim which is likely the cause of his C. difficile along with diarrhea.  His stools went from brown and loose to more watery.  He was seen by [facility] who felt he was okay for discharge and regular diet as well as 14-day course of oral vancomycin.  He was sent home with a prescription of vancomycin, PRN Levsin per GI, PRN simethicone, and a 3-day prescription of low-dose oxycodone which I consulted him directly and he is not to fill unless absolutely necessary.  His insurance changes over for November 1 and he needs to find a primary care provider and have an appointment with this person the first week of November if able.     Asthma  - No acute issues.  - Can order as needed albuterol inhaler as needed.     Anxiety/depression: Continue PTA Paxil and as needed Klonopin.     Anemia  Hgb 11.7 on admit, --> 11 after IVF.  Previously 13.3 on 7/30/19. Denies seeing any blood in his stool.  Hemoglobin somewhat delusional during admission due to IV fluids.  Recommended repeat labs with primary care provider and continued monitoring of his anemia which was initially diagnosed in July of this year.          Recent Labs   Lab 10/19/19  0620 10/18/19  1129   HGB 11.0* 11.7*      Hyponatremia  Mild, asymptomatic. Hypovolemic on admit. Na+ unchanged.  Patient to have repeat labs with primary care provider in 1 to 2 weeks.  He tolerated IV fluids during his stay.     Unintentional Weight loss  Weight was 208 pounds in July 2019, now down to 165 pounds. Albumin 1.6.  -  Concern for underlying GI disease contributing as noted above.  - [facility] consulted    Consultations This Hospital Stay   GASTROENTEROLOGY IP CONSULT    Code Status   Full Code    Time Spent on this Encounter   I, Oralia Christianson PA-C, personally saw the patient today and spent greater than 30 minutes discharging this patient.       Oralia Christianson PA-C  [facility]  ______________________________________________________________________    Physical Exam   Vital Signs: Temp: 96.3  F (35.7  C) Temp src: Oral BP: 94/53 Pulse: 83   Resp: 16 SpO2: 95 % O2 Device: None (Room air)    Weight: 169 lbs 0 oz    General: Awake, alert, middle aged male who appears stated age. Looks comfortable sitting up in bed. No acute distress.  HEENT: Normocephalic, atraumatic. Extraocular movements intact.   Respiratory: Clear to auscultation bilaterally, no rales, wheezing, or rhonchi.  Cardiovascular: Regular rate and rhythm, +S1 and S2, no murmur auscultated. No peripheral edema.   Gastrointestinal: Soft, mild TTP diffusely, non-distended. Bowel sounds present.  No rebound or guarding.  Skin: Warm, dry. No obvious rashes or lesions on exposed skin. Dorsalis pedis pulses palpable bilaterally.  Musculoskeletal: No joint swelling, erythema or tenderness. Moves all extremities equally.  Neurologic: AAO x3. Cranial nerves 2-12 grossly intact, normal strength and sensation.  Psychiatric: Appropriate mood and affect. No obvious anxiety or depression.       Primary Care Physician   Physician No Ref-Primary    Discharge Orders      Reason for your hospital stay    Admitted for recurrent diarrhea, found to have clostridium difficile colitis, you were started on antibiotics and seen by gastroenterology.     Follow-up and recommended labs and tests     Follow up with primary care provider, Physician No Ref-Primary, within 7 days to evaluate treatment change and for hospital follow- up.  The following labs/tests are recommended:  repeat basic labs and follow up on Cdiff improvement.     Activity    Your activity upon discharge: activity as tolerated     Diet    Follow this diet upon discharge: Orders Placed This Encounter      Regular Diet Adult       Significant Results and Procedures   Results for orders placed or performed during the hospital encounter of 10/18/19   CT Abdomen Pelvis w Contrast    Narrative    CT ABDOMEN AND PELVIS WITH CONTRAST 10/18/2019 1:06 PM    HISTORY:  44-year-old patient with acute generalized abdominal pain  with fever.    COMPARISON: None.    TECHNIQUE: Axial and coronal CT images obtained from the lung bases  through the abdomen and pelvis after the uneventful administration of  Isovue 370 intravenous contrast given for a total of 83 mL. Radiation  dose for this scan was reduced using automated exposure control,  adjustment of the mA and/or kV according to patient size, or iterative  reconstruction technique.    FINDINGS: The visible lung bases are clear. Heart size is normal. The  liver, gallbladder, spleen, adrenal glands, and pancreas are  unremarkable. Both kidneys are normally perfused. No hydronephrosis or  nephrolithiasis. Trace free fluid in the left paracolic gutter and  pelvis. The bladder is decompressed. No acute osseous abnormality.  There is wall thickening and mild distention in the colon at the  splenic flexure as well as possible involvement of both ascending and  descending portions of the colon. Some inflammatory changes do  surround majority of the colon, though seemingly with rectal sparing.  The appendix is normal in size and appearance. No abnormally dilated  loops of small bowel.      Impression    IMPRESSION:  Wall thickening with variable degree of surrounding  inflammatory change throughout majority of the colon involving  ascending, transverse, descending portions of the colon, though  without obvious sigmoid or rectal involvement. Differential diagnosis  includes inflammatory or  infectious etiology as most likely. Ischemic  or neoplastic considered less likely. Appendix is normal in size and  appearance.    DENISE SHAW MD       Discharge Medications   Current Discharge Medication List      START taking these medications    Details   hyoscyamine (ANASPAZ/LEVSIN) 0.125 MG tablet Take 1 tablet (125 mcg) by mouth every 4 hours as needed for cramping  Qty: 30 tablet, Refills: 0    Comments: Future refills by PCP  Physician No Ref-Primary with phone number None.  Associated Diagnoses: Clostridium difficile infection      oxyCODONE (ROXICODONE) 5 MG tablet Take 1 tablet (5 mg) by mouth every 6 hours as needed for moderate to severe pain  Qty: 12 tablet, Refills: 0    Comments: Future refills by PCP  Physician No Ref-Primary with phone number None.  Associated Diagnoses: Clostridium difficile infection      simethicone (MYLICON) 80 MG chewable tablet Take 1 tablet (80 mg) by mouth every 6 hours as needed for flatulence or cramping  Qty: 12 tablet, Refills: 0    Comments: Future refills by PCP  Physician No Ref-Primary with phone number None.  Associated Diagnoses: Clostridium difficile infection      vancomycin (FIRVANQ) 50 MG/ML oral solution Take 2.5 mLs (125 mg) by mouth 4 times daily for 14 days  Qty: 140 mL, Refills: 0    Comments: Future refills by PCP  Physician No Ref-Primary with phone number None.  Associated Diagnoses: Clostridium difficile infection         CONTINUE these medications which have NOT CHANGED    Details   chlorpheniramine (CHLOR-TRIMETON) 4 MG tablet Take 4 mg by mouth every 6 hours as needed for allergies or rhinitis      clonazePAM (KLONOPIN) 0.5 MG tablet Take 0.5 mg by mouth 2 times daily as needed for anxiety      hypromellose (ARTIFICIAL TEARS) 0.5 % SOLN ophthalmic solution Place 1 drop into both eyes every hour as needed for dry eyes      lactobacillus rhamnosus, GG, (CULTURELL) capsule Take 1 capsule by mouth daily      PARoxetine (PAXIL) 10 MG  tablet Take 20 mg by mouth every morning      ALBUTEROL IN Inhale 1-2 puffs into the lungs every 6 hours as needed            Allergies   Allergies   Allergen Reactions     Penicillins Shortness Of Breath and Hives     All cillin's medication.

## 2019-10-19 NOTE — CONSULTS
Consult Date:  10/19/2019      GASTROENTEROLOGY CONSULTATION     REASON FOR CONSULTATION:  C. diff colitis.     REQUESTING PHYSICIAN:  Dr. Nieto     HISTORY OF PRESENT ILLNESS:  Mr. Dee is a pleasant 44-year-old male with a history of depression and asthma who was admitted through the emergency room with a 3-week history of diarrhea.  This started on 09/27/2019.  He had diarrhea for about a week and went to the urgent care and was diagnosed with Shigella.  He was treated with Bactrim.  However, his diarrhea did not get significantly better.    He has been having up to 10-12 watery, nonbloody bowel movements a day.  These do wake him up at night.  He has had diffuse abdominal cramping which does not get better after a bowel movement.  He feels hot in the evening, but no documented fevers or chills.  He vomited once during this illness.  Currently, he has had no nausea or vomiting, and has been tolerating a regular diet at home.     He presented to [facility].  White count was 8.2.  Hemoglobin 11.7.  He had a CT scan done that showed thickening of the colon, of the ascending, transverse, and descending.  Stool studies were done and were positive for C. diff.  He was started on oral vancomycin.     He is not sure how he got Shigella.  He reports that he has been working very hard, not eating or sleeping well.  However, he denies any ill contacts, travel, or camping.     PAST MEDICAL HISTORY:   1.  Mild anemia.   2.  Asthma.   3.  Depression and anxiety.   4.  Ankle surgery.     SOCIAL HISTORY:  A former smoker.  Does not use illicit drugs.     FAMILY HISTORY:  No known family history of GI disease.     REVIEW OF SYSTEMS:  A 10-point review of systems was done and was negative other than mentioned in HPI.     PHYSICAL EXAMINATION:   VITAL SIGNS:  Temperature 97.4 0.5 respiratory rate 16, BP is 108/57, pulse is 84.   GENERAL:  The patient is pleasant, awake, alert, and oriented, no acute distress.   HEENT:   Normocephalic, atraumatic.  Pupils equal, round, reactive to light.  Extraocular eye muscles intact.  Sclerae anicteric.  Oropharynx is clear.  Mucous membranes are moist.   NECK:  Supple without lymphadenopathy.   CHEST:  Clear to auscultation bilaterally.   HEART:  Regular rate and rhythm.   ABDOMEN:  Soft, mildly diffusely tender, no rebound or guarding.  Bowel sounds are present.   EXTREMITIES:  Warm, without lower extremity edema.  There is no clubbing or cyanosis.   PSYCHIATRIC:  No obvious anxiety or depression.    DERMATOLOGIC:  Exam reveals no obvious rashes.   NEUROLOGIC:  Cranial nerves II-XII grossly intact.     ASSESSMENT:  This is a 44-year-old male with Clostridium difficile colitis, likely related to recent Bactrim use.  He is clearly nontoxic and well appearing, and in no acute distress.     PLAN:   1.  Regular diet okay today.   2.  Continue 14-day course of oral vancomycin.   3.  Okay from a GI standpoint to discharge home today since he is in no distress.  He able to take in p.o.   4.  Will try hyoscyamine as needed for abdominal pain.     Thank you for allowing me to participate in the care of this patient.  Please contact me with any questions or concerns.         DEJUAN MATUTE MD             D: 10/19/2019   T: 10/19/2019   MT: LESLIE      Name:     KORY CROOK   MRN:      [id number]        Account:       [id number]   :      1975           Consult Date:  10/19/2019      Document: [id number]

## 2019-10-19 NOTE — PROVIDER NOTIFICATION
MD Notification    Notified Person: MD    Notified Person Name: Dr. Nieto    Notification Date/Time: 10/18/2019 2120    Notification Interaction: Telephone    Purpose of Notification: Pt + C Diff    Orders Received: PO 125mg Vanco once, Dr. Nieto to order scheduled doses    Comments:

## 2019-10-19 NOTE — PROGRESS NOTES
Discharge Note-Discharge summary reviewed with pt and spouse,Patient signed and received copy of AVS and signed prescription for oxycodone,pt belonging from security returned to pt,  meds were e prescribed to his pharmacy, pt left in good condition with his spouse.

## 2019-10-19 NOTE — PLAN OF CARE
VSS on RA. A&Ox4. Independent. Morphine given x2 for abdominal pain and cramping. Clear liquid diet. Enteric precautions maintained.PIV infusing. GI consult. Continue to monitor

## 2019-10-19 NOTE — PROGRESS NOTES
Observation Goals     -diagnostic tests and consults completed and resulted:   NOT MET  -vital signs normal or at patient baseline: MET   -tolerating oral intake to maintain hydration: NOT MET    Nurse to notify provider when observation goals have been met and patient is ready for discharge.

## 2019-10-19 NOTE — PROGRESS NOTES
Observation goals PRIOR TO DISCHARGE     Comments: -diagnostic tests and consults completed and resulted - MET  -vital signs normal or at patient baseline - MET  -tolerating oral intake to maintain hydration - MET  Nurse to notify provider when observation goals have been met and patient is ready for discharge.

## 2019-10-19 NOTE — PROGRESS NOTES
End of Shift Report:  Arden Dee, ADMITTED ON: 10/18/2019 due to Diarrhea  . CODE STATUS: Full Code.  Patient is alert, oriented x 4. Vital Signs are stable  ACTIVITY: independent.  PAIN: Oxycodone. TELE:no. ON GOING TREATMENT: Vanco. PO QID . DIAGNOSTIC TEST/S: (+) C. Diff . IVF:  cc/hr. PLAN: GI saw him today and is cleared for DC. Had 1 BM for today

## 2019-10-30 ENCOUNTER — HEALTH MAINTENANCE LETTER (OUTPATIENT)
Age: 44
End: 2019-10-30

## 2019-11-04 ENCOUNTER — OFFICE VISIT (OUTPATIENT)
Dept: URGENT CARE | Facility: URGENT CARE | Age: 44
End: 2019-11-04
Payer: COMMERCIAL

## 2019-11-04 VITALS
DIASTOLIC BLOOD PRESSURE: 70 MMHG | SYSTOLIC BLOOD PRESSURE: 115 MMHG | OXYGEN SATURATION: 100 % | TEMPERATURE: 98.3 F | HEART RATE: 94 BPM | BODY MASS INDEX: 24.96 KG/M2 | WEIGHT: 169 LBS

## 2019-11-04 DIAGNOSIS — A09 DIARRHEA OF INFECTIOUS ORIGIN: ICD-10-CM

## 2019-11-04 DIAGNOSIS — A09 DIARRHEA OF INFECTIOUS ORIGIN: Primary | ICD-10-CM

## 2019-11-04 DIAGNOSIS — Z86.19 HISTORY OF CLOSTRIDIOIDES DIFFICILE INFECTION: ICD-10-CM

## 2019-11-04 PROCEDURE — 87506 IADNA-DNA/RNA PROBE TQ 6-11: CPT | Performed by: PHYSICIAN ASSISTANT

## 2019-11-04 PROCEDURE — 87493 C DIFF AMPLIFIED PROBE: CPT | Mod: 59 | Performed by: PHYSICIAN ASSISTANT

## 2019-11-04 PROCEDURE — 99214 OFFICE O/P EST MOD 30 MIN: CPT

## 2019-11-04 RX ORDER — HYOSCYAMINE SULFATE 0.125 MG
0.12 TABLET ORAL EVERY 4 HOURS PRN
Qty: 30 TABLET | Refills: 1 | Status: SHIPPED | OUTPATIENT
Start: 2019-11-04

## 2019-11-04 RX ORDER — METRONIDAZOLE 500 MG/1
500 TABLET ORAL 3 TIMES DAILY
Qty: 42 TABLET | Refills: 0 | Status: SHIPPED | OUTPATIENT
Start: 2019-11-04 | End: 2019-11-18

## 2019-11-04 ASSESSMENT — ENCOUNTER SYMPTOMS
BLOOD IN STOOL: 1
APPETITE CHANGE: 1
DIARRHEA: 1
ABDOMINAL PAIN: 1

## 2019-11-05 LAB
C DIFF TOX B STL QL: NEGATIVE
SPECIMEN SOURCE: NORMAL

## 2019-11-05 NOTE — PATIENT INSTRUCTIONS
Patient Education     Understanding Colitis   Colitis is when a part of your colon becomes inflamed or swollen. The colon is also called the large intestine. It helps with digestion and waste removal.   What causes colitis?   Colitis can be caused by many things. The most common causes are:    Viral or bacterial infections    Inflammatory bowel disease (ulcerative colitis or Crohn s disease)    Certain medicines, such as antibiotics    Radiation therapy to the colon   Symptoms of colitis   The symptoms of colitis may last a short time. Or they can be chronic. The most common symptoms are:    Diarrhea, sometimes bloody    Stomach pain or cramping    Fever    Weight loss in severe cases   Diagnosing colitis  Your healthcare provider will take a full health history and family history. He or she will also give you a physical exam. Depending on the results of your history and physical exam, your provider may also order certain tests to help find out the cause of your colitis. These may include:    Lab tests. Your blood and stool will be checked.    Endoscopy and biopsy.  Endoscopy uses a long, flexible tube with a tiny light and camera on one end to check the inside of your large intestine. When only the colon is checked, this is called a colonoscopy. During an endoscopy, your provider may take a small sample of your tissue to look at under a microscope. This is called a biopsy.  Treatment for colitis   Treatment for colitis depends on what is causing it and how serious your symptoms are. In some cases, you may not need treatment. For example, colitis from an infection may go away without care.   Treatment may include:     Medicines. You may take these by mouth (oral) or  as a rectal suppository or enema. They can lessen swelling and ease symptoms.    Changes in your diet. Some foods can make symptoms worse. Common triggers are milk, coffee, alcohol, and fried foods.    Surgery. In some cases, you may need surgery to  remove a damaged part of the colon.     Call 911  Call 911 if any of these occur:    Trouble breathing    Confusion    Very drowsy or trouble awakening    Fainting or loss of consciousness    Rapid heart rate    Chest pain   When to call your healthcare provider   Call your healthcare provider right away if you have any of these:    Symptoms that don t get better, or get worse    Fever of 100.4 F (38 C) or higher, or as directed by your healthcare provider    Pain that gets worse    Bloody diarrhea    Bleeding from your rectum    New symptoms      Date Last Reviewed: 12/1/2017 2000-2018 [employer]. [address] [place] [id number]. All rights reserved. This information is not intended as a substitute for professional medical care. Always follow your healthcare professional's instructions.

## 2019-11-05 NOTE — PROGRESS NOTES
SUBJECTIVE:   Arden Dee is a 44 year old male presenting with a chief complaint of   Chief Complaint   Patient presents with     Urgent Care     Abdominal Pain     was dx with c-diff. finished antibotics saturday. he is still having cramping, still having diarrhea.        He is a new patient of [facility].  Patient was initially treated for shigella infection with bactrim DS.  He then developed bloody diarrhea and was diagnosed with C.diff, hospitalized x 1 and sent with rx for vancomycin with patient finished on Sunday.  Diarrhea started immediately.  Additionally, patient was taking culturelle and eating yougurt.  Patient asking for RF on levsin.  Patient states diarrhea has same odor as before.  He did not follow up with GI due to insurance changes and thus here.      Gastro     Onset of symptoms was ongoing.  Course of illness is worsening.    Severity severe  Current and Associated symptoms:  abdominal pain is diffuse, cramping and diarrhea  Aggravating factors: nothing.    Alleviating factors:nothing  Diarrhea: Yes  many stools/day and is persisting  Stools: with blood mixed in  Vomitting: No  Appetite: decreased  Risk factors: recent hospitalization      Review of Systems   Constitutional: Positive for appetite change.   Gastrointestinal: Positive for abdominal pain, blood in stool and diarrhea.   All other systems reviewed and are negative.      Past Medical History:   Diagnosis Date     Anxiety      Asthma      Depression      No family history on file.  Current Outpatient Medications   Medication Sig Dispense Refill     ALBUTEROL IN Inhale 1-2 puffs into the lungs every 6 hours as needed        chlorpheniramine (CHLOR-TRIMETON) 4 MG tablet Take 4 mg by mouth every 6 hours as needed for allergies or rhinitis       clonazePAM (KLONOPIN) 0.5 MG tablet Take 0.5 mg by mouth 2 times daily as needed for anxiety       hyoscyamine (ANASPAZ/LEVSIN) 0.125 MG tablet Take 1 tablet (125 mcg) by mouth every 4 hours  as needed for cramping 30 tablet 1     hyoscyamine (ANASPAZ/LEVSIN) 0.125 MG tablet Take 1 tablet (125 mcg) by mouth every 4 hours as needed for cramping 30 tablet 0     hypromellose (ARTIFICIAL TEARS) 0.5 % SOLN ophthalmic solution Place 1 drop into both eyes every hour as needed for dry eyes       lactobacillus rhamnosus, GG, (CULTURELL) capsule Take 1 capsule by mouth daily       metroNIDAZOLE (FLAGYL) 500 MG tablet Take 1 tablet (500 mg) by mouth 3 times daily for 14 days 42 tablet 0     oxyCODONE (ROXICODONE) 5 MG tablet Take 1 tablet (5 mg) by mouth every 6 hours as needed for moderate to severe pain 12 tablet 0     PARoxetine (PAXIL) 10 MG tablet Take 20 mg by mouth every morning       simethicone (MYLICON) 80 MG chewable tablet Take 1 tablet (80 mg) by mouth every 6 hours as needed for flatulence or cramping 12 tablet 0     Social History     Tobacco Use     Smoking status: Former Smoker     Packs/day: 0.00     Smokeless tobacco: Never Used   Substance Use Topics     Alcohol use: Not on file       OBJECTIVE  /70   Pulse 94   Temp 98.3  F (36.8  C) (Oral)   Wt 76.7 kg (169 lb)   SpO2 100%   BMI 24.96 kg/m      Physical Exam  Vitals signs and nursing note reviewed.   Constitutional:       Appearance: Normal appearance. He is normal weight.   Cardiovascular:      Rate and Rhythm: Normal rate and regular rhythm.      Pulses: Normal pulses.      Heart sounds: Normal heart sounds.   Pulmonary:      Breath sounds: Normal breath sounds.   Abdominal:      General: Abdomen is flat. There is no distension.      Palpations: Abdomen is soft.      Tenderness: There is tenderness. There is no rebound.      Hernia: No hernia is present.   Skin:     General: Skin is warm and dry.      Comments: No tenting   Neurological:      Mental Status: He is alert.   Psychiatric:         Mood and Affect: Mood normal.         Behavior: Behavior normal.         Labs:  No results found for this or any previous visit (from the  past 24 hour(s)).    X-Ray was not done.    ASSESSMENT:      ICD-10-CM    1. Diarrhea of infectious origin A09 Clostridium difficile Toxin B PCR     Enteric Bacteria and Virus Panel by [facility] Stool     metroNIDAZOLE (FLAGYL) 500 MG tablet     hyoscyamine (ANASPAZ/LEVSIN) 0.125 MG tablet   2. History of Clostridioides difficile infection Z86.19 metroNIDAZOLE (FLAGYL) 500 MG tablet        Medical Decision Making:    Differential Diagnosis:  Gastro: c. diff    Serious Comorbid Conditions:  Adult:  None    PLAN:    Gastro: Fluids, time, rest, BRAT Diet and flagyl with PCP/GI follow up    Followup:    If not improving or if condition worsens, follow up with your Primary Care Provider, If not improving or if conditions worsens over the next 12-24 hours, go to the Emergency Department  Discussed the plan with patient.  He will get established with PCP this week    Patient Instructions       Patient Education     Understanding Colitis   Colitis is when a part of your colon becomes inflamed or swollen. The colon is also called the large intestine. It helps with digestion and waste removal.   What causes colitis?   Colitis can be caused by many things. The most common causes are:    Viral or bacterial infections    Inflammatory bowel disease (ulcerative colitis or Crohn s disease)    Certain medicines, such as antibiotics    Radiation therapy to the colon   Symptoms of colitis   The symptoms of colitis may last a short time. Or they can be chronic. The most common symptoms are:    Diarrhea, sometimes bloody    Stomach pain or cramping    Fever    Weight loss in severe cases   Diagnosing colitis  Your healthcare provider will take a full health history and family history. He or she will also give you a physical exam. Depending on the results of your history and physical exam, your provider may also order certain tests to help find out the cause of your colitis. These may include:    Lab tests. Your blood and stool will be  checked.    Endoscopy and biopsy.  Endoscopy uses a long, flexible tube with a tiny light and camera on one end to check the inside of your large intestine. When only the colon is checked, this is called a colonoscopy. During an endoscopy, your provider may take a small sample of your tissue to look at under a microscope. This is called a biopsy.  Treatment for colitis   Treatment for colitis depends on what is causing it and how serious your symptoms are. In some cases, you may not need treatment. For example, colitis from an infection may go away without care.   Treatment may include:     Medicines. You may take these by mouth (oral) or  as a rectal suppository or enema. They can lessen swelling and ease symptoms.    Changes in your diet. Some foods can make symptoms worse. Common triggers are milk, coffee, alcohol, and fried foods.    Surgery. In some cases, you may need surgery to remove a damaged part of the colon.     Call 911  Call 911 if any of these occur:    Trouble breathing    Confusion    Very drowsy or trouble awakening    Fainting or loss of consciousness    Rapid heart rate    Chest pain   When to call your healthcare provider   Call your healthcare provider right away if you have any of these:    Symptoms that don t get better, or get worse    Fever of 100.4 F (38 C) or higher, or as directed by your healthcare provider    Pain that gets worse    Bloody diarrhea    Bleeding from your rectum    New symptoms      Date Last Reviewed: 12/1/2017 2000-2018 [employer]. [address] [place] [address], [place] [address]. All rights reserved. This information is not intended as a substitute for professional medical care. Always follow your healthcare professional's instructions.

## 2019-11-18 ENCOUNTER — OFFICE VISIT (OUTPATIENT)
Dept: FAMILY MEDICINE | Facility: CLINIC | Age: 44
End: 2019-11-18
Payer: COMMERCIAL

## 2019-11-18 VITALS
TEMPERATURE: 98.9 F | SYSTOLIC BLOOD PRESSURE: 118 MMHG | OXYGEN SATURATION: 98 % | BODY MASS INDEX: 27.25 KG/M2 | WEIGHT: 184.5 LBS | RESPIRATION RATE: 18 BRPM | HEART RATE: 85 BPM | DIASTOLIC BLOOD PRESSURE: 68 MMHG

## 2019-11-18 DIAGNOSIS — R10.84 ABDOMINAL PAIN, GENERALIZED: Primary | ICD-10-CM

## 2019-11-18 DIAGNOSIS — Z86.19 HX OF CLOSTRIDIUM DIFFICILE INFECTION: ICD-10-CM

## 2019-11-18 LAB
ERYTHROCYTE [DISTWIDTH] IN BLOOD BY AUTOMATED COUNT: 15.9 % (ref 10–15)
HCT VFR BLD AUTO: 38.8 % (ref 40–53)
HGB BLD-MCNC: 12.7 G/DL (ref 13.3–17.7)
MCH RBC QN AUTO: 29 PG (ref 26.5–33)
MCHC RBC AUTO-ENTMCNC: 32.7 G/DL (ref 31.5–36.5)
MCV RBC AUTO: 89 FL (ref 78–100)
PLATELET # BLD AUTO: 373 10E9/L (ref 150–450)
RBC # BLD AUTO: 4.38 10E12/L (ref 4.4–5.9)
WBC # BLD AUTO: 7.8 10E9/L (ref 4–11)

## 2019-11-18 PROCEDURE — 80053 COMPREHEN METABOLIC PANEL: CPT | Performed by: FAMILY MEDICINE

## 2019-11-18 PROCEDURE — 85027 COMPLETE CBC AUTOMATED: CPT | Performed by: FAMILY MEDICINE

## 2019-11-18 PROCEDURE — 36415 COLL VENOUS BLD VENIPUNCTURE: CPT | Performed by: FAMILY MEDICINE

## 2019-11-18 PROCEDURE — 99214 OFFICE O/P EST MOD 30 MIN: CPT | Performed by: FAMILY MEDICINE

## 2019-11-18 NOTE — PROGRESS NOTES
Subjective     Arden Dee is a 44 year old male who presents to clinic today for the following health issues:    HPI   ED/UC Followup:    Facility:  [facility]  Date of visit: 11/04/2019  Reason for visit: C Diff   Current Status: Patient was negative at that visit. Was given Flagyl at that visit and seems to be better now.          Abdominal Pain      Duration: intermittent    Description (location/character/radiation): mild crampy pain       Associated flank pain: None    Intensity:  mild    Accompanying signs and symptoms:        Fever/Chills: no        Gas/Bloating: no        Nausea/vomitting: no        Diarrhea: no        Dysuria or Hematuria: no     History (previous similar pain/trauma/previous testing): Hx of having had Shigella infection and then C diff     Precipitating or alleviating factors:       Pain worse with eating/BM/urination: no       Pain relieved by BM: YES- somewhat    Therapies tried and outcome: Just finished course of Flagyl    LMP:  not applicable      Pt was diagnosed with Shigella infection at end of Sept.  No idea where he picked up infection.  He was treated with course of Bactrim    After about a week on the Bactrim then diarrhea got much worse.  He had no insurance at the time so tried to wait it out but lost 25 #.  He was seen at [facility] ER on 10/18/19 and was diagnosed with C diff infection.  He completed 2 week course of Vancomycin    Symptoms improved but still had some loose stools and cramping.  He was seen in urgent care on 11/4/19.  The tests then came back negative for both C diff and Shigella  He was given Rx for Flagyl which he just finished    Much better but not quite back to normal    BP Readings from Last 3 Encounters:   11/18/19 118/68   11/04/19 115/70   10/19/19 98/55    Wt Readings from Last 3 Encounters:   11/18/19 83.7 kg (184 lb 8 oz)   11/04/19 76.7 kg (169 lb)   10/19/19 76.7 kg (169 lb)                    Reviewed and updated as needed this visit by  Provider  Tobacco  Allergies  Meds  Problems  Med Hx  Surg Hx  Fam Hx         Review of Systems   ROS COMP: CONSTITUTIONAL: NEGATIVE for fever, chills, change in weight  ENT/MOUTH: NEGATIVE for ear, mouth and throat problems  RESP: NEGATIVE for significant cough or SOB  CV: NEGATIVE for chest pain, palpitations or peripheral edema  GI: POSITIVE for abdominal pain generalized and gas or bloating      Objective    /68   Pulse 85   Temp 98.9  F (37.2  C) (Tympanic)   Resp 18   Wt 83.7 kg (184 lb 8 oz)   SpO2 98%   BMI 27.25 kg/m    Body mass index is 27.25 kg/m .  Physical Exam   GENERAL: healthy, alert and no distress  NECK: no adenopathy, no asymmetry, masses, or scars and thyroid normal to palpation  RESP: lungs clear to auscultation - no rales, rhonchi or wheezes  CV: regular rate and rhythm, normal S1 S2, no S3 or S4, no murmur, click or rub, no peripheral edema and peripheral pulses strong  ABDOMEN: tenderness mild diffuse tenderness, no organomegaly or masses and bowel sounds normal    Diagnostic Test Results:  Labs reviewed in [facility]  Lab: see below results pending        Assessment & Plan     Arden was seen today for recheck.    Diagnoses and all orders for this visit:    Abdominal pain, generalized  -     CBC with platelets  -     Comprehensive metabolic panel (BMP + Alb, Alk Phos, ALT, AST, Total. Bili, TP)    Hx of Clostridium difficile infection           FUTURE APPOINTMENTS:       - Follow-up visit in 1 mo if not resolved  Patient Instructions   Continue with yogurt with active culture or probiotics       Return in about 1 month (around 12/18/2019), or if symptoms worsen or fail to improve, for abdominal pain.    Fady Oneil MD  [facility]

## 2019-11-19 LAB
ALBUMIN SERPL-MCNC: 3.4 G/DL (ref 3.4–5)
ALP SERPL-CCNC: 37 U/L (ref 40–150)
ALT SERPL W P-5'-P-CCNC: 23 U/L (ref 0–70)
ANION GAP SERPL CALCULATED.3IONS-SCNC: 7 MMOL/L (ref 3–14)
AST SERPL W P-5'-P-CCNC: 12 U/L (ref 0–45)
BILIRUB SERPL-MCNC: 0.2 MG/DL (ref 0.2–1.3)
BUN SERPL-MCNC: 15 MG/DL (ref 7–30)
CALCIUM SERPL-MCNC: 9.4 MG/DL (ref 8.5–10.1)
CHLORIDE SERPL-SCNC: 104 MMOL/L (ref 94–109)
CO2 SERPL-SCNC: 23 MMOL/L (ref 20–32)
CREAT SERPL-MCNC: 0.76 MG/DL (ref 0.66–1.25)
GFR SERPL CREATININE-BSD FRML MDRD: >90 ML/MIN/{1.73_M2}
GLUCOSE SERPL-MCNC: 86 MG/DL (ref 70–99)
POTASSIUM SERPL-SCNC: 4.5 MMOL/L (ref 3.4–5.3)
PROT SERPL-MCNC: 7.3 G/DL (ref 6.8–8.8)
SODIUM SERPL-SCNC: 134 MMOL/L (ref 133–144)

## 2021-01-15 ENCOUNTER — HEALTH MAINTENANCE LETTER (OUTPATIENT)
Age: 46
End: 2021-01-15

## 2021-09-04 ENCOUNTER — HEALTH MAINTENANCE LETTER (OUTPATIENT)
Age: 46
End: 2021-09-04

## 2022-02-19 ENCOUNTER — HEALTH MAINTENANCE LETTER (OUTPATIENT)
Age: 47
End: 2022-02-19

## 2022-10-22 ENCOUNTER — HEALTH MAINTENANCE LETTER (OUTPATIENT)
Age: 47
End: 2022-10-22

## 2023-04-01 ENCOUNTER — HEALTH MAINTENANCE LETTER (OUTPATIENT)
Age: 48
End: 2023-04-01